# Patient Record
Sex: FEMALE | Race: WHITE
[De-identification: names, ages, dates, MRNs, and addresses within clinical notes are randomized per-mention and may not be internally consistent; named-entity substitution may affect disease eponyms.]

---

## 2018-06-19 NOTE — EDPHYS
Physician Documentation                                                                           

 [facility]                                                                

Name: Paulette Cornejo                                                                             

Age: 38 yrs                                                                                       

Sex: Female                                                                                       

: 1980                                                                                   

MRN: [id number]                                                                                   

Arrival Date: 2018                                                                          

Time: 06:42                                                                                       

Account#: [id number]                                                                            

Bed 13                                                                                            

Private MD:                                                                                       

ED Physician Korey Miguel                                                                      

HPI:                                                                                              

                                                                                             

07:01 This 38 yrs old  Female presents to ER via Ambulatory with complaints of       snw 

      Vomiting.                                                                                   

07:01 The patient presents to the emergency department with nausea, vomiting. Onset: The      snw 

      symptoms/episode began/occurred suddenly, at 01:00. Possible causes: unknown. The           

      symptoms are aggravated by nothing. The symptoms are alleviated by nothing. Associated      

      signs and symptoms: The patient has no apparent associated signs or symptoms. Severity      

      of symptoms: At their worst the symptoms were moderate. The patient has not experienced     

      similar symptoms in the past. It is unknown whether or not the patient has recently         

      seen a physician.                                                                           

                                                                                                  

OB/GYN:                                                                                           

06:49 LMP 6/10/2018                                                                           ak1 

                                                                                                  

Historical:                                                                                       

- Allergies:                                                                                      

06:49 PENICILLINS;                                                                            ak1 

- Home Meds:                                                                                      

06:49 None [Active];                                                                          ak1 

- PMHx:                                                                                           

06:49 None;                                                                                   ak1 

- PSHx:                                                                                           

06:49 ;                                                                              ak1 

                                                                                                  

- Immunization history:: Adult Immunizations unknown.                                             

- Social history:: Smoking status: Patient uses tobacco products, smokes one-half pack            

  cigarettes per day.                                                                             

- Ebola Screening: : No symptoms or risks identified at this time.                                

                                                                                                  

                                                                                                  

ROS:                                                                                              

07:01 Constitutional: Negative for fever, chills, and weight loss, Eyes: Negative for injury, snw 

      pain, redness, and discharge, ENT: Negative for injury, pain, and discharge, Neck:          

      Negative for injury, pain, and swelling, Cardiovascular: Negative for chest pain,           

      palpitations, and edema, Respiratory: Negative for shortness of breath, cough,              

      wheezing, and pleuritic chest pain, Abdomen/GI: Negative for abdominal pain, diarrhea,      

      and constipation, + nausea and vomiting since 0100 Back: Negative for injury and pain,      

      MS/Extremity: Negative for injury and deformity, Skin: Negative for injury, rash, and       

      discoloration, Neuro: Negative for headache, weakness, numbness, tingling, and seizure.     

                                                                                                  

Exam:                                                                                             

07:00 Constitutional:  This is a well developed, well nourished patient who is awake, alert,  snw 

      and in no acute distress. Head/Face:  Normocephalic, atraumatic. Eyes:  Pupils equal        

      round and reactive to light, extra-ocular motions intact.  Lids and lashes normal.          

      Conjunctiva and sclera are non-icteric and not injected.  Cornea within normal limits.      

      Periorbital areas with no swelling, redness, or edema. Neck:  Trachea midline, no           

      thyromegaly or masses palpated, and no cervical lymphadenopathy.  Supple, full range of     

      motion without nuchal rigidity, or vertebral point tenderness.  No Meningismus.             

      Chest/axilla:  Normal chest wall appearance and motion.  Nontender with no deformity.       

      No lesions are appreciated. Cardiovascular:  Regular rate and rhythm with a normal S1       

      and S2.  No gallops, murmurs, or rubs.  Normal PMI, no JVD.  No pulse deficits.             

      Respiratory:  Lungs have equal breath sounds bilaterally, clear to auscultation and         

      percussion.  No rales, rhonchi or wheezes noted.  No increased work of breathing, no        

      retractions or nasal flaring. Abdomen/GI:  Soft, non-tender, with normal bowel sounds.      

      No distension or tympany.  No guarding or rebound.  No evidence of tenderness               

      throughout. Back:  No spinal tenderness.  No costovertebral tenderness.  Full range of      

      motion. Skin:  Warm, dry with normal turgor.  Normal color with no rashes, no lesions,      

      and no evidence of cellulitis. MS/ Extremity:  Pulses equal, no cyanosis.                   

      Neurovascular intact.  Full, normal range of motion. Neuro:  Awake and alert, GCS 15,       

      oriented to person, place, time, and situation.  Cranial nerves II-XII grossly intact.      

      Motor strength 5/5 in all extremities.  Sensory grossly intact.  Cerebellar exam            

      normal.  Normal gait. Psych:  Awake, alert, with orientation to person, place and time.     

       Behavior, mood, and affect are within normal limits.                                       

07:00 ENT: External ear(s): are unremarkable, Ear canal(s): are normal, Dental exam:              

      edentulous.                                                                                 

                                                                                                  

Vital Signs:                                                                                      

06:49  / 86; Pulse 76; Resp 20; Temp 98.0(O); Pulse Ox 99% on R/A; Weight 72.57 kg (R); ak1 

      Height 5 ft. 3 in. (160.02 cm) (R); Pain 0/10;                                              

07:34  / 87; Pulse 66; Resp 17; Pulse Ox 99% on R/A;                                    tw2 

06:49 Body Mass Index 28.34 (72.57 kg, 160.02 cm)                                             ak1 

                                                                                                  

MDM:                                                                                              

06:48 Patient medically screened.                                                             snw 

08:13 Data reviewed: vital signs, nurses notes. Data interpreted: Pulse oximetry: on room air snw 

      is 99 %. Interpretation: normal. Counseling: I had a detailed discussion with the           

      patient and/or guardian regarding: the historical points, exam findings, and any            

      diagnostic results supporting the discharge/admit diagnosis, the presence of at least       

      one elevated blood pressure reading (>120/80) during this emergency department visit,       

      lab results, the need for outpatient follow up, to return to the emergency department       

      if symptoms worsen or persist or if there are any questions or concerns that arise at       

      home. Special discussion: I have referred the patient to see his PCP for further            

      evaluation of high blood pressure. Based on the history and exam findings, there is no      

      indication for further emergent testing or inpatient evaluation. I discussed with the       

      patient/guardian the need to see the primary care provider for further evaluation of        

      the symptoms.                                                                               

                                                                                                  

                                                                                             

07:00 Order name: Urine Dipstick--Ancillary (enter results)                                   oe  

                                                                                             

07:30 Order name: PO challenge; Complete Time: 07:45                                          snw 

                                                                                                  

Administered Medications:                                                                         

06:54 Drug: Zofran 4 mg Route: PO;                                                            bs1 

07:59 Follow up: Response: No adverse reaction; Marked relief of symptoms; Nausea is decreasedtw2 

                                                                                                  

                                                                                                  

Disposition:                                                                                      

                                                                                             

08:00 Co-signature as Attending Physician, Korey Miguel MD Available for consultation at    ps1 

      all times..                                                                                 

                                                                                                  

Disposition:                                                                                      

18 08:11 Discharged to Home. Impression: Nausea and vomiting.                               

- Condition is Stable.                                                                            

- Discharge Instructions: Food Choices to Help Relieve Diarrhea, Adult, Clear Liquid              

  Diet, Nausea and Vomiting.                                                                      

- Prescriptions for Zofran 4 mg Oral Tablet - take 1 tablet by ORAL route 3-4 times               

  daily As needed; 20 tablet.                                                                     

- Work release form, Family Work Release, Medication Reconciliation Form, Thank You               

  Letter, Antibiotic Education, Prescription Opioid Use form.                                     

- Follow up: Private Physician; When: 2 - 3 days; Reason: Recheck today's complaints,             

  Continuance of care, Re-evaluation by your physician. Follow up: Emergency                      

  Department; When: As needed; Reason: Worsening of condition.                                    

                                                                                                  

                                                                                                  

                                                                                                  

Signatures:                                                                                       

Dispatcher [employer]                           EDMS                                                 

Татьяна Alford, FNP-C                 FNP-Csnw                                                  

Angie Guillermo, RN                       RN   ak1                                                  

Montse Uriostegui RN                          RN   tw2                                                  

Korey Miguel MD MD   ps1                                                  

Cristina Caal, RN                   RN   bs1                                                  

                                                                                                  

Corrections: (The following items were deleted from the chart)                                    

                                                                                             

08:19 08:11 2018 08:11 Discharged to Home. Impression: Nausea and vomiting. Condition   tw2 

      is Stable. Forms are Medication Reconciliation Form, Thank You Letter, Antibiotic           

      Education, Prescription Opioid Use. Follow up: Private Physician; When: 2 - 3 days;         

      Reason: Recheck today's complaints, Continuance of care, Re-evaluation by your              

      physician. Follow up: Emergency Department; When: As needed; Reason: Worsening of           

      condition. snw                                                                              

                                                                                                  

**************************************************************************************************

## 2018-06-19 NOTE — ER
Nurse's Notes                                                                                     

 [facility]                                                                

Name: Paulette Cornejo                                                                             

Age: 38 yrs                                                                                       

Sex: Female                                                                                       

: 1980                                                                                   

MRN: [id number]                                                                                   

Arrival Date: 2018                                                                          

Time: 06:42                                                                                       

Account#: [id number]                                                                            

Bed 13                                                                                            

Private MD:                                                                                       

Diagnosis: Nausea and vomiting                                                                    

                                                                                                  

Presentation:                                                                                     

                                                                                             

06:48 Presenting complaint: Patient states: vomiting since 0130. pt c/o upper abd and         ak1 

      epigastric pain only while vomiting. Transition of care: patient was not received from      

      another setting of care. Onset of symptoms was 2018. Risk Assessment: Do you       

      want to hurt yourself or someone else? Patient reports no desire to harm self or            

      others. Initial Sepsis Screen: Does the patient meet any 2 criteria? No. Patient's          

      initial sepsis screen is negative. Does the patient have a suspected source of              

      infection? No. Patient's initial sepsis screen is negative. Care prior to arrival: None.    

06:48 Method Of Arrival: Ambulatory                                                           ak1 

06:48 Acuity: KISHORE 3                                                                           ak1 

                                                                                                  

Triage Assessment:                                                                                

06:49 General: Appears in no apparent distress. Behavior is calm, cooperative. Pain: Denies   ak1 

      pain. GI: Reports nausea, vomiting, Patient currently denies diarrhea.                      

                                                                                                  

OB/GYN:                                                                                           

06:49 LMP 6/10/2018                                                                           ak1 

                                                                                                  

Historical:                                                                                       

- Allergies:                                                                                      

06:49 PENICILLINS;                                                                            ak1 

- Home Meds:                                                                                      

06:49 None [Active];                                                                          ak1 

- PMHx:                                                                                           

06:49 None;                                                                                   ak1 

- PSHx:                                                                                           

06:49 ;                                                                              ak1 

                                                                                                  

- Immunization history:: Adult Immunizations unknown.                                             

- Social history:: Smoking status: Patient uses tobacco products, smokes one-half pack            

  cigarettes per day.                                                                             

- Ebola Screening: : No symptoms or risks identified at this time.                                

                                                                                                  

                                                                                                  

Screenin:50 Abuse screen: Denies threats or abuse. Denies injuries from another. Nutritional        ak1 

      screening: No deficits noted. Tuberculosis screening: No symptoms or risk factors           

      identified. Fall Risk None identified.                                                      

                                                                                                  

Assessment:                                                                                       

06:51 General: Appears in no apparent distress. uncomfortable, Behavior is calm, cooperative, bs1 

      appropriate for age. Pain: Complains of pain in epigastric. Neuro: Level of                 

      Consciousness is awake, alert, obeys commands, Oriented to person, place, time,             

      situation. Cardiovascular: Denies chest pain, shortness of breath, Heart tones S1 S2        

      present Capillary refill < 3 seconds Patient's skin is warm and dry. Respiratory:           

      Airway is patent Trachea midline Respiratory effort is even, unlabored, Respiratory         

      pattern is regular, symmetrical, Breath sounds are clear bilaterally. GI: Abdomen is        

      round non-distended, Bowel sounds present X 4 quads. Abdomen is tender to palpation in      

      epigastric area Reports epigastric pain, nausea, vomiting, Patient currently denies         

      bloody stool, diarrhea, rectal bleeding. : Denies burning with urination. EENT: No        

      signs and/or symptoms were reported regarding the EENT system. Derm: Skin is intact,        

      Skin is pink, warm \T\ dry. normal. Musculoskeletal: Circulation, motion, and sensation     

      intact. Capillary refill < 3 seconds, Range of motion: intact in all extremities.           

06:57 Reassessment: Report given to LIAM Willard.                                                 bs1 

07:34 Reassessment: Patient appears in no apparent distress at this time. Patient and/or      tw2 

      family updated on plan of care and expected duration. Pain level reassessed. Patient is     

      alert, oriented x 3, equal unlabored respirations, skin warm/dry/pink. Patient states       

      feeling better. Patient states symptoms have improved.                                      

07:36 Reassessment: pt given sprite and water at this time, pt stated "im ready for something tw2 

      to drink".                                                                                  

08:18 Reassessment: Patient appears in no apparent distress at this time. Patient and/or      tw2 

      family updated on plan of care and expected duration. Pain level reassessed. Patient is     

      alert, oriented x 3, equal unlabored respirations, skin warm/dry/pink.                      

                                                                                                  

Vital Signs:                                                                                      

06:49  / 86; Pulse 76; Resp 20; Temp 98.0(O); Pulse Ox 99% on R/A; Weight 72.57 kg (R); ak1 

      Height 5 ft. 3 in. (160.02 cm) (R); Pain 0/10;                                              

07:34  / 87; Pulse 66; Resp 17; Pulse Ox 99% on R/A;                                    tw2 

06:49 Body Mass Index 28.34 (72.57 kg, 160.02 cm)                                             ak1 

                                                                                                  

ED Course:                                                                                        

06:42 Patient arrived in ED.                                                                  am2 

06:48 [facility]                                                        snw 

06:48 Korey Miguel MD is Attending Physician.                                             snw 

06:49 Triage completed.                                                                       ak1 

06:49 Arm band placed on Patient placed in an exam room, on a stretcher, on pulse oximetry,   ak1 

      Patient notified of wait time.                                                              

06:50 Patient has correct armband on for positive identification. Bed in low position. Call   ak1 

      light in reach. Side rails up X 1. Adult w/ patient. Pulse ox on. NIBP on.                  

06:59 Montse Uriostegui, RN is Primary Nurse.                                                        tw2 

07:45 Diet: Patient given water. Tolerated well pt given sprite, pt tolerated well..          tw2 

07:46 No provider procedures requiring assistance completed.                                  tw2 

08:18 Patient did not have IV access during this emergency room visit.                        tw2 

                                                                                                  

Administered Medications:                                                                         

06:54 Drug: Zofran 4 mg Route: PO;                                                            bs1 

07:59 Follow up: Response: No adverse reaction; Marked relief of symptoms; Nausea is decreasedtw2 

                                                                                                  

                                                                                                  

Outcome:                                                                                          

08:11 Discharge ordered by MD.                                                                snw 

08:18 Discharged to home ambulatory, with friend.                                             tw2 

08:18 Condition: stable                                                                           

08:18 Discharge instructions given to patient, friend, Instructed on discharge instructions,      

      follow up and referral plans. medication usage, Demonstrated understanding of               

      instructions, follow-up care, medications, Prescriptions given X 1.                         

08:19 Patient left the ED.                                                                    tw2 

                                                                                                  

Signatures:                                                                                       

Татьяна Alford, FNP-C                 FNP-Csnw                                                  

Angie Guillermo, RN                       RN   ak1                                                  

Montse Uriostegui, LIAM                          RN   tw2                                                  

Arabella Sabillon Brittany RN                   RN   bs1                                                  

                                                                                                  

**************************************************************************************************

## 2018-11-15 ENCOUNTER — HOSPITAL ENCOUNTER (EMERGENCY)
Dept: HOSPITAL 97 - ER | Age: 38
Discharge: HOME | End: 2018-11-15
Payer: SELF-PAY

## 2018-11-15 DIAGNOSIS — F17.210: ICD-10-CM

## 2018-11-15 DIAGNOSIS — Z88.0: ICD-10-CM

## 2018-11-15 DIAGNOSIS — J02.0: Primary | ICD-10-CM

## 2018-11-15 PROCEDURE — 87804 INFLUENZA ASSAY W/OPTIC: CPT

## 2018-11-15 PROCEDURE — 99283 EMERGENCY DEPT VISIT LOW MDM: CPT

## 2018-11-15 PROCEDURE — 87081 CULTURE SCREEN ONLY: CPT

## 2018-11-15 NOTE — EDPHYS
Physician Documentation                                                                           

 [facility]                                                                

Name: Paulette Cornejo                                                                             

Age: 38 yrs                                                                                       

Sex: Female                                                                                       

: 1980                                                                                   

MRN: [id number]                                                                                   

Arrival Date: 11/15/2018                                                                          

Time: 13:08                                                                                       

Account#: [id number]                                                                            

Bed 28                                                                                            

Private MD: None, None                                                                            

ED Physician Gunnar Maher                                                                         

HPI:                                                                                              

11/15                                                                                             

14:59 This 38 yrs old  Female presents to ER via Ambulatory with complaints of Sore  jmm 

      Throat.                                                                                     

14:59 The patient presents with sore throat. Onset: The symptoms/episode began/occurred       jmm 

      gradually, 4 day(s) ago. Associated signs and symptoms: Pertinent positives: fever,         

      flu-like symptoms. This is a 38 year old female with no chronic medical conditions that     

      presents to the ED with sore throat beginning approx 4 days ago with fever and chills.      

      Patient denies cough. .                                                                     

                                                                                                  

Historical:                                                                                       

- Allergies:                                                                                      

13:21 PENICILLINS;                                                                            aj  

- Home Meds:                                                                                      

13:21 None [Active];                                                                          aj  

- PMHx:                                                                                           

13:21 None;                                                                                   aj  

- PSHx:                                                                                           

13:21 ;                                                                              aj  

                                                                                                  

- Immunization history:: Adult Immunizations up to date.                                          

- Social history:: Smoking status: Patient uses tobacco products, smokes one-half pack            

  cigarettes per day.                                                                             

- Ebola Screening: : Patient negative for fever greater than or equal to 101.5 degrees            

  Fahrenheit, and additional compatible Ebola Virus Disease symptoms Patient denies               

  exposure to infectious person Patient denies travel to an Ebola-affected area in the            

  21 days before illness onset No symptoms or risks identified at this time.                      

                                                                                                  

                                                                                                  

ROS:                                                                                              

14:59 Eyes: Negative for injury, pain, redness, and discharge.                                jmm 

14:59 Cardiovascular: Negative for chest pain, palpitations, and edema, Respiratory: Negative     

      for shortness of breath, cough, wheezing, and pleuritic chest pain, Abdomen/GI:             

      Negative for abdominal pain, nausea, vomiting, diarrhea, and constipation.                  

14:59 Constitutional: Positive for fever.                                                         

14:59 ENT: Positive for sore throat.                                                              

14:59 All other systems are negative.                                                             

                                                                                                  

Exam:                                                                                             

14:59 Head/Face:  atraumatic. Eyes:  EOMI, no conjunctival erythema appreciated Chest/axilla: jm 

       Normal chest wall appearance and motion.   Cardiovascular:  Regular rate and rhythm.       

      No edema appreciated Respiratory:  Normal respirations, no respiratory distress             

      appreciated                                                                                 

14:59 Back:  Normal ROM Skin:  General appearance color normal MS/ Extremity:  Moves all          

      extremities, no obvious deformities appreciated, no edema noted to the lower                

      extremities  Neuro:  Awake and alert, normal gait Psych:  Behavior is normal, Mood is       

      normal, Patient is cooperative and pleasant                                                 

14:59 Constitutional: The patient appears in no acute distress, alert, awake.                     

14:59 ENT: Posterior pharynx: erythema, that is mild.                                             

14:59 Neck: Lymph nodes: lymphadenopathy is appreciated, anterior cervical nodes.                 

                                                                                                  

Vital Signs:                                                                                      

13:21  / 88; Pulse 97; Resp 18; Temp 99.9; Pulse Ox 100% on R/A; Weight 72.57 kg;       aj  

      Height 5 ft. 3 in. (160.02 cm);                                                             

16:41  / 101; Pulse 97; Resp 17; Pulse Ox 100% on R/A;                                  rv  

13:21 Body Mass Index 28.34 (72.57 kg, 160.02 cm)                                             aj  

                                                                                                  

MDM:                                                                                              

14:59 Patient medically screened.                                                             [facility] 

16:23 Data reviewed: vital signs, nurses notes. Counseling: I had a detailed discussion with  [facility] 

      the patient and/or guardian regarding: the historical points, exam findings, and any        

      diagnostic results supporting the discharge/admit diagnosis, lab results, the need for      

      outpatient follow up, to return to the emergency department if symptoms worsen or           

      persist or if there are any questions or concerns that arise at home.                       

                                                                                                  

11/15                                                                                             

15:12 Order name: Flu; Complete Time: 16:20                                                   [facility] 

11/15                                                                                             

15:12 Order name: Strep; Complete Time: 16:20                                                 [facility] 

                                                                                                  

Administered Medications:                                                                         

16:23 Drug: Tylenol 650 mg Route: PO;                                                         rv  

                                                                                                  

                                                                                                  

Disposition:                                                                                      

17:53 Co-signature as Attending Physician, Gunnar Maher MD.                                    rn  

                                                                                                  

Disposition:                                                                                      

11/15/18 16:23 Discharged to Home. Impression: Streptococcal pharyngitis.                         

- Condition is Stable.                                                                            

- Discharge Instructions: Strep Throat.                                                           

- Prescriptions for Zithromax Z- Lc 250 mg Oral Tablet - take 1 tablet by ORAL route             

  as directed for 5 days Day 1 - take two (2) tablets one time. Day 2, 3, 4 , 5 take              

  one (1) tablet once daily.; 6 tablet.                                                           

- Medication Reconciliation Form, Thank You Letter, Antibiotic Education, Prescription            

  Opioid Use form.                                                                                

- Follow up: Private Physician; When: 2 - 3 days; Reason: Recheck today's complaints,             

  Continuance of care, Re-evaluation by your physician.                                           

                                                                                                  

                                                                                                  

                                                                                                  

Signatures:                                                                                       

Dispatcher [employer]                           Arabella Pizano RN                       Collin Santos PA PA jmm Nieto, Roman, MD MD rn Vicente, Ronaldo, RN RN   rv                                                   

                                                                                                  

Corrections: (The following items were deleted from the chart)                                    

16:42 16:23 11/15/2018 16:23 Discharged to Home. Impression: Streptococcal pharyngitis.       rv  

      Condition is Stable. Forms are Medication Reconciliation Form, Thank You Letter,            

      Antibiotic Education, Prescription Opioid Use. Follow up: Private Physician; When: 2 -      

      3 days; Reason: Recheck today's complaints, Continuance of care, Re-evaluation by your      

      physician. fritz                                                                              

                                                                                                  

**************************************************************************************************

## 2018-11-15 NOTE — ER
Nurse's Notes                                                                                     

 [facility]                                                                

Name: Paulette Cornejo                                                                             

Age: 38 yrs                                                                                       

Sex: Female                                                                                       

: 1980                                                                                   

MRN: [id number]                                                                                   

Arrival Date: 11/15/2018                                                                          

Time: 13:08                                                                                       

Account#: [id number]                                                                            

Bed 28                                                                                            

Private MD: None, None                                                                            

Diagnosis: Streptococcal pharyngitis                                                              

                                                                                                  

Presentation:                                                                                     

11/15                                                                                             

13:20 Presenting complaint: Patient states: Sore throat, nasal congestion, body aches for 1   aj  

      week. Transition of care: patient was not received from another setting of care. Onset      

      of symptoms was 2018. Risk Assessment: Do you want to hurt yourself or         

      someone else? Patient reports no desire to harm self or others. Initial Sepsis Screen:      

      Does the patient meet any 2 criteria? No. Patient's initial sepsis screen is negative.      

      Does the patient have a suspected source of infection? No. Patient's initial sepsis         

      screen is negative. Care prior to arrival: None.                                            

13:20 Method Of Arrival: Ambulatory                                                           aj  

13:20 Acuity: KISHORE 4                                                                           aj  

                                                                                                  

Triage Assessment:                                                                                

13:21 General: Appears in no apparent distress. comfortable, Behavior is calm, cooperative,   aj  

      appropriate for age. Pain: Complains of pain in body aches. EENT: Reports pain when         

      swallowing. EENT: Throat is reddened. Neuro: Level of Consciousness is awake, alert,        

      obeys commands, Oriented to person, place, time, situation, Appropriate for age.            

      Respiratory: Airway is patent Respiratory effort is even, unlabored, Respiratory            

      pattern is regular, symmetrical. Derm: Skin is intact, is healthy with good turgor,         

      Skin is pink, warm \T\ dry. normal.                                                         

                                                                                                  

Historical:                                                                                       

- Allergies:                                                                                      

13:21 PENICILLINS;                                                                            aj  

- Home Meds:                                                                                      

13:21 None [Active];                                                                          aj  

- PMHx:                                                                                           

13:21 None;                                                                                   aj  

- PSHx:                                                                                           

13:21 ;                                                                              aj  

                                                                                                  

- Immunization history:: Adult Immunizations up to date.                                          

- Social history:: Smoking status: Patient uses tobacco products, smokes one-half pack            

  cigarettes per day.                                                                             

- Ebola Screening: : Patient negative for fever greater than or equal to 101.5 degrees            

  Fahrenheit, and additional compatible Ebola Virus Disease symptoms Patient denies               

  exposure to infectious person Patient denies travel to an Ebola-affected area in the            

  21 days before illness onset No symptoms or risks identified at this time.                      

                                                                                                  

                                                                                                  

Screenin:58 Abuse screen: Denies threats or abuse. Denies injuries from another. Nutritional        rv  

      screening: No deficits noted. Tuberculosis screening: No symptoms or risk factors           

      identified. Fall Risk None identified.                                                      

                                                                                                  

Assessment:                                                                                       

14:55 General: Appears in no apparent distress. comfortable, Behavior is calm, cooperative.   rv  

      Pain: Complains of pain in generalized (body aches). Neuro: Level of Consciousness is       

      awake, alert, obeys commands, Oriented to person, place, time, situation.                   

      Cardiovascular: Capillary refill < 3 seconds. Respiratory: Airway is patent Breath          

      sounds are clear bilaterally. GI: No signs and/or symptoms were reported involving the      

      gastrointestinal system. : No signs and/or symptoms were reported regarding the           

      genitourinary system. EENT: Throat is clear. EENT: Reports pain in throat. Derm: Skin       

      is intact. Musculoskeletal: No signs and/or symptoms reported regarding the                 

      musculoskeletal system.                                                                     

14:57 Respiratory: Respiratory effort is even.                                                rv  

                                                                                                  

Vital Signs:                                                                                      

13:21  / 88; Pulse 97; Resp 18; Temp 99.9; Pulse Ox 100% on R/A; Weight 72.57 kg;       aj  

      Height 5 ft. 3 in. (160.02 cm);                                                             

16:41  / 101; Pulse 97; Resp 17; Pulse Ox 100% on R/A;                                  rv  

13:21 Body Mass Index 28.34 (72.57 kg, 160.02 cm)                                             aj  

                                                                                                  

ED Course:                                                                                        

13:08 Patient arrived in ED.                                                                  mr  

13:08 None, None is Private Physician.                                                        mr  

13:21 Triage completed.                                                                       aj  

13:21 Arm band placed on left wrist. Patient placed in waiting room, Patient notified of wait aj  

      time.                                                                                       

14:53 Collin West PA is PHCP.                                                              [facility] 

14:53 Gunnar Maher MD is Attending Physician.                                                [facility] 

14:58 Patient has correct armband on for positive identification. Bed in low position. Call   rv  

      light in reach. Side rails up X 1. Adult w/ patient. Pulse ox on. NIBP on.                  

16:41 No provider procedures requiring assistance completed. Patient did not have IV access   rv  

      during this emergency room visit.                                                           

                                                                                                  

Administered Medications:                                                                         

16:23 Drug: Tylenol 650 mg Route: PO;                                                         rv  

                                                                                                  

                                                                                                  

Outcome:                                                                                          

16:23 Discharge ordered by MD.                                                                fritz 

16:41 Discharged to home ambulatory.                                                          rv  

16:41 Condition: good                                                                             

16:41 Discharge instructions given to patient, Instructed on discharge instructions, follow       

      up and referral plans. medication usage, Demonstrated understanding of instructions,        

      follow-up care, medications, Prescriptions given X 1.                                       

16:42 Patient left the ED.                                                                    rv  

                                                                                                  

Signatures:                                                                                       

Arabella Mitchell, RN                       RN   Collin Rubin PA PA jmm Rivera, Mary                                                                                    

MaximoUmer RN                    RN   rv                                                   

                                                                                                  

Corrections: (The following items were deleted from the chart)                                    

58 14:55 EENT: No signs and/or symptoms were reported regarding the EENT system. rv        rv  

14:58 14:55 EENT: Throat is clear rv                                                          rv  

                                                                                                  

**************************************************************************************************